# Patient Record
Sex: FEMALE | Race: WHITE | URBAN - METROPOLITAN AREA
[De-identification: names, ages, dates, MRNs, and addresses within clinical notes are randomized per-mention and may not be internally consistent; named-entity substitution may affect disease eponyms.]

---

## 2023-04-07 ENCOUNTER — OFFICE VISIT (OUTPATIENT)
Dept: URBAN - METROPOLITAN AREA CLINIC 51 | Facility: CLINIC | Age: 83
End: 2023-04-07
Payer: MEDICARE

## 2023-04-07 DIAGNOSIS — H43.313 VITREOUS MEMBRANES AND STRANDS, BILATERAL: ICD-10-CM

## 2023-04-07 DIAGNOSIS — H35.371 PUCKERING OF MACULA, RIGHT EYE: Primary | ICD-10-CM

## 2023-04-07 DIAGNOSIS — H50.89 OTHER SPECIFIED STRABISMUS: ICD-10-CM

## 2023-04-07 DIAGNOSIS — Z96.1 PRESENCE OF INTRAOCULAR LENS: ICD-10-CM

## 2023-04-07 PROCEDURE — 92134 CPTRZ OPH DX IMG PST SGM RTA: CPT | Performed by: OPTOMETRIST

## 2023-04-07 PROCEDURE — 92004 COMPRE OPH EXAM NEW PT 1/>: CPT | Performed by: OPTOMETRIST

## 2023-04-07 ASSESSMENT — INTRAOCULAR PRESSURE
OD: 17
OS: 14

## 2023-04-07 ASSESSMENT — VISUAL ACUITY
OD: 20/80
OS: 20/25

## 2023-04-07 NOTE — IMPRESSION/PLAN
Impression: Presence of intraocular lens: Z96.1. Plan: Clear and centered IOL's OU. S/p YAG capsulotomy OU. Pt feels like she is not seeing well. Discussed glasses are at maximum level they can be at.

## 2023-04-07 NOTE — IMPRESSION/PLAN
Impression: Puckering of macula, right eye: H35.371. Plan: Educated patient on today's findings. ERM w/ fibrotic tissue OD. MAC OCT taken today. Likely having some affect on patients vision. Patient to contact our office with any changes, worsening or distortion in vision. Monitor with MAC OCT.

## 2023-04-07 NOTE — IMPRESSION/PLAN
Impression: Other specified strabismus: H50.89. Plan: History of lazy eye surgery OD. Will continue to limit BCVA.